# Patient Record
Sex: FEMALE | Race: WHITE | Employment: OTHER | URBAN - METROPOLITAN AREA
[De-identification: names, ages, dates, MRNs, and addresses within clinical notes are randomized per-mention and may not be internally consistent; named-entity substitution may affect disease eponyms.]

---

## 2018-05-08 RX ORDER — SODIUM CHLORIDE 0.9 % (FLUSH) 0.9 %
5-10 SYRINGE (ML) INJECTION EVERY 8 HOURS
Status: CANCELLED | OUTPATIENT
Start: 2018-05-08

## 2018-05-08 RX ORDER — SODIUM CHLORIDE, SODIUM LACTATE, POTASSIUM CHLORIDE, CALCIUM CHLORIDE 600; 310; 30; 20 MG/100ML; MG/100ML; MG/100ML; MG/100ML
150 INJECTION, SOLUTION INTRAVENOUS CONTINUOUS
Status: CANCELLED | OUTPATIENT
Start: 2018-05-14

## 2018-05-08 RX ORDER — SODIUM CHLORIDE 0.9 % (FLUSH) 0.9 %
5-10 SYRINGE (ML) INJECTION AS NEEDED
Status: CANCELLED | OUTPATIENT
Start: 2018-05-08

## 2018-05-09 RX ORDER — MAGNESIUM 200 MG
1000 TABLET ORAL DAILY
COMMUNITY

## 2018-05-09 RX ORDER — GLUCOSAMINE HCL 500 MG
5000 TABLET ORAL DAILY
COMMUNITY

## 2018-05-09 RX ORDER — OMEGA-3-ACID ETHYL ESTERS 1 G/1
2 CAPSULE, LIQUID FILLED ORAL
COMMUNITY

## 2018-05-09 RX ORDER — GLUCOSAMINE/CHONDR SU A SOD 750-600 MG
1 TABLET ORAL DAILY
COMMUNITY

## 2018-05-09 RX ORDER — ASCORBIC ACID 500 MG
3000 TABLET ORAL DAILY
COMMUNITY

## 2018-05-09 RX ORDER — BISMUTH SUBSALICYLATE 262 MG
1 TABLET,CHEWABLE ORAL DAILY
COMMUNITY

## 2018-05-09 RX ORDER — BACLOFEN 20 MG
500 TABLET ORAL DAILY
COMMUNITY

## 2018-05-09 RX ORDER — ESCITALOPRAM OXALATE 10 MG/1
5 TABLET ORAL DAILY
COMMUNITY

## 2018-05-09 RX ORDER — HYDROCHLOROTHIAZIDE 25 MG/1
25 TABLET ORAL DAILY
COMMUNITY
End: 2019-01-01

## 2018-05-10 ENCOUNTER — HOSPITAL ENCOUNTER (OUTPATIENT)
Dept: PREADMISSION TESTING | Age: 78
Discharge: HOME | End: 2018-05-10
Payer: COMMERCIAL

## 2018-05-10 ENCOUNTER — HOSPITAL ENCOUNTER (OUTPATIENT)
Dept: LAB | Age: 78
Discharge: HOME | End: 2018-05-10
Payer: COMMERCIAL

## 2018-05-10 DIAGNOSIS — Z01.818 PRE-OP TESTING: ICD-10-CM

## 2018-05-10 LAB
ABO + RH BLD: NORMAL
BASOPHILS # BLD: 0 K/UL (ref 0–0.06)
BASOPHILS NFR BLD: 1 % (ref 0–2)
BLOOD GROUP ANTIBODIES SERPL: NORMAL
DIFFERENTIAL METHOD BLD: ABNORMAL
EOSINOPHIL # BLD: 0.1 K/UL (ref 0–0.4)
EOSINOPHIL NFR BLD: 3 % (ref 0–5)
ERYTHROCYTE [DISTWIDTH] IN BLOOD BY AUTOMATED COUNT: 12.7 % (ref 11.6–14.5)
HCT VFR BLD AUTO: 37.4 % (ref 35–45)
HGB BLD-MCNC: 13 G/DL (ref 12–16)
LYMPHOCYTES # BLD: 1.2 K/UL (ref 0.9–3.6)
LYMPHOCYTES NFR BLD: 33 % (ref 21–52)
MCH RBC QN AUTO: 32.5 PG (ref 24–34)
MCHC RBC AUTO-ENTMCNC: 34.8 G/DL (ref 31–37)
MCV RBC AUTO: 93.5 FL (ref 74–97)
MONOCYTES # BLD: 0.3 K/UL (ref 0.05–1.2)
MONOCYTES NFR BLD: 8 % (ref 3–10)
NEUTS SEG # BLD: 2 K/UL (ref 1.8–8)
NEUTS SEG NFR BLD: 55 % (ref 40–73)
PLATELET # BLD AUTO: 272 K/UL (ref 135–420)
PMV BLD AUTO: 9.8 FL (ref 9.2–11.8)
RBC # BLD AUTO: 4 M/UL (ref 4.2–5.3)
SPECIMEN EXP DATE BLD: NORMAL
WBC # BLD AUTO: 3.6 K/UL (ref 4.6–13.2)

## 2018-05-10 PROCEDURE — 86900 BLOOD TYPING SEROLOGIC ABO: CPT | Performed by: OBSTETRICS & GYNECOLOGY

## 2018-05-10 PROCEDURE — 93005 ELECTROCARDIOGRAM TRACING: CPT

## 2018-05-10 PROCEDURE — 36415 COLL VENOUS BLD VENIPUNCTURE: CPT | Performed by: OBSTETRICS & GYNECOLOGY

## 2018-05-10 PROCEDURE — 85025 COMPLETE CBC W/AUTO DIFF WBC: CPT | Performed by: OBSTETRICS & GYNECOLOGY

## 2018-05-11 ENCOUNTER — ANESTHESIA EVENT (OUTPATIENT)
Dept: SURGERY | Age: 78
End: 2018-05-11
Payer: COMMERCIAL

## 2018-05-11 LAB
ATRIAL RATE: 57 BPM
CALCULATED P AXIS, ECG09: 56 DEGREES
CALCULATED T AXIS, ECG11: 48 DEGREES
DIAGNOSIS, 93000: NORMAL
P-R INTERVAL, ECG05: 196 MS
Q-T INTERVAL, ECG07: 444 MS
QRS DURATION, ECG06: 104 MS
QTC CALCULATION (BEZET), ECG08: 432 MS
VENTRICULAR RATE, ECG03: 57 BPM

## 2018-05-14 ENCOUNTER — HOSPITAL ENCOUNTER (OUTPATIENT)
Age: 78
Setting detail: OBSERVATION
Discharge: HOME | End: 2018-05-15
Attending: OBSTETRICS & GYNECOLOGY | Admitting: OBSTETRICS & GYNECOLOGY
Payer: COMMERCIAL

## 2018-05-14 ENCOUNTER — ANESTHESIA (OUTPATIENT)
Dept: SURGERY | Age: 78
End: 2018-05-14
Payer: COMMERCIAL

## 2018-05-14 PROBLEM — N95.0 POSTMENOPAUSAL BLEEDING: Status: ACTIVE | Noted: 2018-05-14

## 2018-05-14 PROBLEM — Z48.89 OBSERVATION AFTER SURGERY: Status: ACTIVE | Noted: 2018-05-14

## 2018-05-14 LAB
ALBUMIN SERPL-MCNC: 3.9 G/DL (ref 3.4–5)
ALBUMIN/GLOB SERPL: 1.4 {RATIO} (ref 0.8–1.7)
ALP SERPL-CCNC: 67 U/L (ref 45–117)
ALT SERPL-CCNC: 27 U/L (ref 13–56)
ANION GAP SERPL CALC-SCNC: 8 MMOL/L (ref 3–18)
AST SERPL-CCNC: 21 U/L (ref 15–37)
BASOPHILS # BLD: 0 K/UL (ref 0–0.06)
BASOPHILS NFR BLD: 0 % (ref 0–2)
BILIRUB SERPL-MCNC: 0.6 MG/DL (ref 0.2–1)
BUN SERPL-MCNC: 11 MG/DL (ref 7–18)
BUN/CREAT SERPL: 16 (ref 12–20)
CALCIUM SERPL-MCNC: 8.3 MG/DL (ref 8.5–10.1)
CHLORIDE SERPL-SCNC: 103 MMOL/L (ref 100–108)
CO2 SERPL-SCNC: 28 MMOL/L (ref 21–32)
CREAT SERPL-MCNC: 0.67 MG/DL (ref 0.6–1.3)
DIFFERENTIAL METHOD BLD: ABNORMAL
EOSINOPHIL # BLD: 0 K/UL (ref 0–0.4)
EOSINOPHIL NFR BLD: 0 % (ref 0–5)
ERYTHROCYTE [DISTWIDTH] IN BLOOD BY AUTOMATED COUNT: 13 % (ref 11.6–14.5)
GLOBULIN SER CALC-MCNC: 2.7 G/DL (ref 2–4)
GLUCOSE SERPL-MCNC: 129 MG/DL (ref 74–99)
HCT VFR BLD AUTO: 37.7 % (ref 35–45)
HGB BLD-MCNC: 13 G/DL (ref 12–16)
LYMPHOCYTES # BLD: 0.5 K/UL (ref 0.9–3.6)
LYMPHOCYTES NFR BLD: 10 % (ref 21–52)
MCH RBC QN AUTO: 32.5 PG (ref 24–34)
MCHC RBC AUTO-ENTMCNC: 34.5 G/DL (ref 31–37)
MCV RBC AUTO: 94.3 FL (ref 74–97)
MONOCYTES # BLD: 0.1 K/UL (ref 0.05–1.2)
MONOCYTES NFR BLD: 2 % (ref 3–10)
NEUTS SEG # BLD: 4.2 K/UL (ref 1.8–8)
NEUTS SEG NFR BLD: 88 % (ref 40–73)
PLATELET # BLD AUTO: 242 K/UL (ref 135–420)
PMV BLD AUTO: 9.1 FL (ref 9.2–11.8)
POTASSIUM SERPL-SCNC: 3.9 MMOL/L (ref 3.5–5.5)
PROT SERPL-MCNC: 6.6 G/DL (ref 6.4–8.2)
RBC # BLD AUTO: 4 M/UL (ref 4.2–5.3)
SODIUM SERPL-SCNC: 139 MMOL/L (ref 136–145)
WBC # BLD AUTO: 4.8 K/UL (ref 4.6–13.2)

## 2018-05-14 PROCEDURE — 77030005537 HC CATH URETH BARD -A: Performed by: OBSTETRICS & GYNECOLOGY

## 2018-05-14 PROCEDURE — 77030020255 HC SOL INJ LR 1000ML BG

## 2018-05-14 PROCEDURE — 77030032682: Performed by: OBSTETRICS & GYNECOLOGY

## 2018-05-14 PROCEDURE — 74011000250 HC RX REV CODE- 250: Performed by: OBSTETRICS & GYNECOLOGY

## 2018-05-14 PROCEDURE — 77030032490 HC SLV COMPR SCD KNE COVD -B: Performed by: OBSTETRICS & GYNECOLOGY

## 2018-05-14 PROCEDURE — 36415 COLL VENOUS BLD VENIPUNCTURE: CPT | Performed by: OBSTETRICS & GYNECOLOGY

## 2018-05-14 PROCEDURE — 85025 COMPLETE CBC W/AUTO DIFF WBC: CPT | Performed by: OBSTETRICS & GYNECOLOGY

## 2018-05-14 PROCEDURE — 74011250636 HC RX REV CODE- 250/636: Performed by: NURSE ANESTHETIST, CERTIFIED REGISTERED

## 2018-05-14 PROCEDURE — 80053 COMPREHEN METABOLIC PANEL: CPT | Performed by: OBSTETRICS & GYNECOLOGY

## 2018-05-14 PROCEDURE — 74011000250 HC RX REV CODE- 250

## 2018-05-14 PROCEDURE — 74011000272 HC RX REV CODE- 272: Performed by: OBSTETRICS & GYNECOLOGY

## 2018-05-14 PROCEDURE — 77030033998 HC TBNG HYSTROSCPC OUTFLO S&N -C: Performed by: OBSTETRICS & GYNECOLOGY

## 2018-05-14 PROCEDURE — 77030027138 HC INCENT SPIROMETER -A

## 2018-05-14 PROCEDURE — 77030020268 HC MISC GENERAL SUPPLY: Performed by: OBSTETRICS & GYNECOLOGY

## 2018-05-14 PROCEDURE — 74011000258 HC RX REV CODE- 258: Performed by: OBSTETRICS & GYNECOLOGY

## 2018-05-14 PROCEDURE — 76060000032 HC ANESTHESIA 0.5 TO 1 HR: Performed by: OBSTETRICS & GYNECOLOGY

## 2018-05-14 PROCEDURE — 77030033997 HC TBNG HYSTROSCPC INFLO S&N -C: Performed by: OBSTETRICS & GYNECOLOGY

## 2018-05-14 PROCEDURE — 77030032151 HC HYSTSCP FLD MGMT KT DISP S&N -D: Performed by: OBSTETRICS & GYNECOLOGY

## 2018-05-14 PROCEDURE — 74011250636 HC RX REV CODE- 250/636

## 2018-05-14 PROCEDURE — 76210000016 HC OR PH I REC 1 TO 1.5 HR: Performed by: OBSTETRICS & GYNECOLOGY

## 2018-05-14 PROCEDURE — 99218 HC RM OBSERVATION: CPT

## 2018-05-14 PROCEDURE — 74011250636 HC RX REV CODE- 250/636: Performed by: OBSTETRICS & GYNECOLOGY

## 2018-05-14 PROCEDURE — 77030013079 HC BLNKT BAIR HGGR 3M -A: Performed by: ANESTHESIOLOGY

## 2018-05-14 PROCEDURE — 76010000160 HC OR TIME 0.5 TO 1 HR INTENSV-TIER 1: Performed by: OBSTETRICS & GYNECOLOGY

## 2018-05-14 PROCEDURE — 77030012510 HC MSK AIRWY LMA TELE -B: Performed by: ANESTHESIOLOGY

## 2018-05-14 PROCEDURE — 74011250637 HC RX REV CODE- 250/637: Performed by: NURSE ANESTHETIST, CERTIFIED REGISTERED

## 2018-05-14 PROCEDURE — 77030018836 HC SOL IRR NACL ICUM -A: Performed by: OBSTETRICS & GYNECOLOGY

## 2018-05-14 RX ORDER — KETOROLAC TROMETHAMINE 30 MG/ML
15 INJECTION, SOLUTION INTRAMUSCULAR; INTRAVENOUS
Status: ACTIVE | OUTPATIENT
Start: 2018-05-14 | End: 2018-05-15

## 2018-05-14 RX ORDER — FENTANYL CITRATE 50 UG/ML
INJECTION, SOLUTION INTRAMUSCULAR; INTRAVENOUS AS NEEDED
Status: DISCONTINUED | OUTPATIENT
Start: 2018-05-14 | End: 2018-05-14 | Stop reason: HOSPADM

## 2018-05-14 RX ORDER — INSULIN LISPRO 100 [IU]/ML
INJECTION, SOLUTION INTRAVENOUS; SUBCUTANEOUS ONCE
Status: DISCONTINUED | OUTPATIENT
Start: 2018-05-14 | End: 2018-05-14 | Stop reason: HOSPADM

## 2018-05-14 RX ORDER — ONDANSETRON 2 MG/ML
4 INJECTION INTRAMUSCULAR; INTRAVENOUS ONCE
Status: DISCONTINUED | OUTPATIENT
Start: 2018-05-14 | End: 2018-05-14 | Stop reason: HOSPADM

## 2018-05-14 RX ORDER — SODIUM CHLORIDE 0.9 % (FLUSH) 0.9 %
5-10 SYRINGE (ML) INJECTION AS NEEDED
Status: DISCONTINUED | OUTPATIENT
Start: 2018-05-14 | End: 2018-05-15 | Stop reason: HOSPADM

## 2018-05-14 RX ORDER — FENTANYL CITRATE 50 UG/ML
25 INJECTION, SOLUTION INTRAMUSCULAR; INTRAVENOUS AS NEEDED
Status: DISCONTINUED | OUTPATIENT
Start: 2018-05-14 | End: 2018-05-14 | Stop reason: HOSPADM

## 2018-05-14 RX ORDER — SODIUM CHLORIDE, SODIUM LACTATE, POTASSIUM CHLORIDE, CALCIUM CHLORIDE 600; 310; 30; 20 MG/100ML; MG/100ML; MG/100ML; MG/100ML
50 INJECTION, SOLUTION INTRAVENOUS CONTINUOUS
Status: DISCONTINUED | OUTPATIENT
Start: 2018-05-14 | End: 2018-05-14 | Stop reason: HOSPADM

## 2018-05-14 RX ORDER — ONDANSETRON 2 MG/ML
INJECTION INTRAMUSCULAR; INTRAVENOUS AS NEEDED
Status: DISCONTINUED | OUTPATIENT
Start: 2018-05-14 | End: 2018-05-14 | Stop reason: HOSPADM

## 2018-05-14 RX ORDER — SODIUM CHLORIDE 0.9 % (FLUSH) 0.9 %
5-10 SYRINGE (ML) INJECTION EVERY 8 HOURS
Status: DISCONTINUED | OUTPATIENT
Start: 2018-05-14 | End: 2018-05-14 | Stop reason: HOSPADM

## 2018-05-14 RX ORDER — MORPHINE SULFATE 4 MG/ML
4 INJECTION INTRAVENOUS
Status: DISCONTINUED | OUTPATIENT
Start: 2018-05-14 | End: 2018-05-14 | Stop reason: HOSPADM

## 2018-05-14 RX ORDER — EPHEDRINE SULFATE 50 MG/ML
INJECTION, SOLUTION INTRAVENOUS AS NEEDED
Status: DISCONTINUED | OUTPATIENT
Start: 2018-05-14 | End: 2018-05-14 | Stop reason: HOSPADM

## 2018-05-14 RX ORDER — PROPOFOL 10 MG/ML
INJECTION, EMULSION INTRAVENOUS AS NEEDED
Status: DISCONTINUED | OUTPATIENT
Start: 2018-05-14 | End: 2018-05-14 | Stop reason: HOSPADM

## 2018-05-14 RX ORDER — SODIUM CHLORIDE 0.9 % (FLUSH) 0.9 %
5-10 SYRINGE (ML) INJECTION AS NEEDED
Status: DISCONTINUED | OUTPATIENT
Start: 2018-05-14 | End: 2018-05-14 | Stop reason: HOSPADM

## 2018-05-14 RX ORDER — KETOROLAC TROMETHAMINE 30 MG/ML
INJECTION, SOLUTION INTRAMUSCULAR; INTRAVENOUS AS NEEDED
Status: DISCONTINUED | OUTPATIENT
Start: 2018-05-14 | End: 2018-05-14 | Stop reason: HOSPADM

## 2018-05-14 RX ORDER — FAMOTIDINE 20 MG/1
20 TABLET, FILM COATED ORAL ONCE
Status: COMPLETED | OUTPATIENT
Start: 2018-05-14 | End: 2018-05-14

## 2018-05-14 RX ORDER — LIDOCAINE HYDROCHLORIDE 20 MG/ML
INJECTION, SOLUTION EPIDURAL; INFILTRATION; INTRACAUDAL; PERINEURAL AS NEEDED
Status: DISCONTINUED | OUTPATIENT
Start: 2018-05-14 | End: 2018-05-14 | Stop reason: HOSPADM

## 2018-05-14 RX ORDER — SODIUM CHLORIDE, SODIUM LACTATE, POTASSIUM CHLORIDE, CALCIUM CHLORIDE 600; 310; 30; 20 MG/100ML; MG/100ML; MG/100ML; MG/100ML
150 INJECTION, SOLUTION INTRAVENOUS CONTINUOUS
Status: DISCONTINUED | OUTPATIENT
Start: 2018-05-14 | End: 2018-05-14 | Stop reason: HOSPADM

## 2018-05-14 RX ORDER — ONDANSETRON 2 MG/ML
4 INJECTION INTRAMUSCULAR; INTRAVENOUS
Status: DISCONTINUED | OUTPATIENT
Start: 2018-05-14 | End: 2018-05-15 | Stop reason: HOSPADM

## 2018-05-14 RX ORDER — SODIUM CHLORIDE 0.9 % (FLUSH) 0.9 %
5-10 SYRINGE (ML) INJECTION EVERY 8 HOURS
Status: DISCONTINUED | OUTPATIENT
Start: 2018-05-14 | End: 2018-05-15 | Stop reason: HOSPADM

## 2018-05-14 RX ORDER — DEXAMETHASONE SODIUM PHOSPHATE 4 MG/ML
INJECTION, SOLUTION INTRA-ARTICULAR; INTRALESIONAL; INTRAMUSCULAR; INTRAVENOUS; SOFT TISSUE AS NEEDED
Status: DISCONTINUED | OUTPATIENT
Start: 2018-05-14 | End: 2018-05-14 | Stop reason: HOSPADM

## 2018-05-14 RX ORDER — HYDROCODONE BITARTRATE AND ACETAMINOPHEN 5; 325 MG/1; MG/1
1 TABLET ORAL
Status: DISCONTINUED | OUTPATIENT
Start: 2018-05-14 | End: 2018-05-15 | Stop reason: HOSPADM

## 2018-05-14 RX ADMIN — LIDOCAINE HYDROCHLORIDE 60 MG: 20 INJECTION, SOLUTION EPIDURAL; INFILTRATION; INTRACAUDAL; PERINEURAL at 07:36

## 2018-05-14 RX ADMIN — PROPOFOL 150 MG: 10 INJECTION, EMULSION INTRAVENOUS at 07:36

## 2018-05-14 RX ADMIN — KETOROLAC TROMETHAMINE 30 MG: 30 INJECTION, SOLUTION INTRAMUSCULAR; INTRAVENOUS at 08:04

## 2018-05-14 RX ADMIN — FAMOTIDINE 20 MG: 20 TABLET ORAL at 06:47

## 2018-05-14 RX ADMIN — Medication 10 ML: at 21:10

## 2018-05-14 RX ADMIN — PROPOFOL 50 MG: 10 INJECTION, EMULSION INTRAVENOUS at 07:37

## 2018-05-14 RX ADMIN — FENTANYL CITRATE 50 MCG: 50 INJECTION, SOLUTION INTRAMUSCULAR; INTRAVENOUS at 07:41

## 2018-05-14 RX ADMIN — ONDANSETRON 4 MG: 2 INJECTION INTRAMUSCULAR; INTRAVENOUS at 07:45

## 2018-05-14 RX ADMIN — EPHEDRINE SULFATE 10 MG: 50 INJECTION, SOLUTION INTRAVENOUS at 07:48

## 2018-05-14 RX ADMIN — FENTANYL CITRATE 25 MCG: 50 INJECTION, SOLUTION INTRAMUSCULAR; INTRAVENOUS at 07:45

## 2018-05-14 RX ADMIN — CEFAZOLIN SODIUM 1 G: 1 INJECTION, POWDER, FOR SOLUTION INTRAMUSCULAR; INTRAVENOUS at 17:13

## 2018-05-14 RX ADMIN — FENTANYL CITRATE 25 MCG: 50 INJECTION, SOLUTION INTRAMUSCULAR; INTRAVENOUS at 08:30

## 2018-05-14 RX ADMIN — CEFAZOLIN SODIUM 1 G: 1 INJECTION, POWDER, FOR SOLUTION INTRAMUSCULAR; INTRAVENOUS at 11:54

## 2018-05-14 RX ADMIN — FENTANYL CITRATE 25 MCG: 50 INJECTION, SOLUTION INTRAMUSCULAR; INTRAVENOUS at 08:42

## 2018-05-14 RX ADMIN — FENTANYL CITRATE 25 MCG: 50 INJECTION, SOLUTION INTRAMUSCULAR; INTRAVENOUS at 07:36

## 2018-05-14 RX ADMIN — Medication 10 ML: at 17:15

## 2018-05-14 RX ADMIN — SODIUM CHLORIDE, SODIUM LACTATE, POTASSIUM CHLORIDE, AND CALCIUM CHLORIDE 50 ML/HR: 600; 310; 30; 20 INJECTION, SOLUTION INTRAVENOUS at 06:47

## 2018-05-14 RX ADMIN — DEXAMETHASONE SODIUM PHOSPHATE 4 MG: 4 INJECTION, SOLUTION INTRA-ARTICULAR; INTRALESIONAL; INTRAMUSCULAR; INTRAVENOUS; SOFT TISSUE at 07:43

## 2018-05-14 RX ADMIN — SODIUM CHLORIDE, SODIUM LACTATE, POTASSIUM CHLORIDE, AND CALCIUM CHLORIDE: 600; 310; 30; 20 INJECTION, SOLUTION INTRAVENOUS at 07:50

## 2018-05-14 RX ADMIN — FENTANYL CITRATE 25 MCG: 50 INJECTION, SOLUTION INTRAMUSCULAR; INTRAVENOUS at 08:57

## 2018-05-14 NOTE — H&P
Gynecology History and Physical    Name: Ada Bedolla MRN: [id number] SSN: [id number]    YOB: 1940  Age: 78 y.o. Sex: female       Subjective:      Mrs. Jesisca Rothman is a 77yo F with postmenopausal bleeding. Presented to my office after noticing bright red vaginal bleeding after 25years of menopause. Denied pain or other concerns. Ultrasound with irregular appearing endometrium. Endometrial biopsy attempted in the office but unsuccessful because of cervical stenosis. Decision made to proceed to OR for hysteroscopy and D&C to r/o hyperplasia or malignancy. OB History     No data available        Past Medical History:   Diagnosis Date    Depression     Hypertension      Past Surgical History:   Procedure Laterality Date    HX DILATION AND CURETTAGE      HX ORTHOPAEDIC Left     Elbow repair     Social History     Occupational History    Not on file. Social History Main Topics    Smoking status: Never Smoker    Smokeless tobacco: Never Used    Alcohol use 6.0 oz/week     10 Glasses of wine per week      Comment: Champagne    Drug use: No    Sexual activity: Not on file     History reviewed. No pertinent family history. No Known Allergies  Prior to Admission medications    Medication Sig Start Date End Date Taking? Authorizing Provider   hydroCHLOROthiazide (HYDRODIURIL) 25 mg tablet Take 25 mg by mouth daily. Yes Historical Provider   escitalopram oxalate (LEXAPRO) 10 mg tablet Take 10 mg by mouth daily. Yes Historical Provider   multivitamin (ONE A DAY) tablet Take 1 Tab by mouth daily. Yes Historical Provider   ascorbic acid, vitamin C, (VITAMIN C) 500 mg tablet Take 3,000 mg by mouth daily. Yes Historical Provider   glucosamine/chondr knutson A sod (GLUCOSAMINE-CHONDROITIN) 750-600 mg tab Take 1 Tab by mouth daily. Yes Historical Provider   homeopathic drugs (ENZYME PO) Take 2 Tabs by mouth daily.    Yes Historical Provider   cyanocobalamin (VITAMIN B-12) 1,000 mcg sublingual tablet Take 1,000 mcg by mouth daily. Yes Historical Provider   Cholecalciferol, Vitamin D3, 3,000 unit tab Take 5,000 Units by mouth daily. Yes Historical Provider   ubidecarenone/vitamin E mixed (COQ10  PO) Take 1 Tab by mouth daily. Yes Historical Provider   flaxseed 1,000 mg cap Take 2,000 mcg by mouth daily. Yes Historical Provider   omega-3 acid ethyl esters (LOVAZA) 1 gram capsule Take 2 g by mouth daily (with breakfast). Yes Historical Provider   B.infantis-B.ani-B.long-B.bifi (PROBIOTIC 4X) 10-15 mg TbEC Take 2 Tabs by mouth two (2) times a day. Yes Historical Provider   magnesium oxide 500 mg tab Take 500 mg by mouth daily. Yes Historical Provider        Review of Systems:  A comprehensive review of systems was negative except for that written in the History of Present Illness. Objective:     Vitals:    05/09/18 1457 05/14/18 0638   BP:  161/86   Pulse:  64   Resp:  18   Temp:  97.7 °F (36.5 °C)   SpO2:  99%   Weight: 52.2 kg (115 lb) 53.5 kg (118 lb)   Height: 5' 1\" (1.549 m) 5' (1.524 m)       Physical Exam:  Patient without distress. Heart: Regular rate and rhythm  Lung: clear to auscultation throughout lung fields, no wheezes, no rales, no rhonchi and normal respiratory effort  Back: costovertebral angle tenderness absent  Abdomen: soft, nontender  External Genitalia: normal general appearance  Urinary system: urethral meatus normal  Vagina: normal mucosa without prolapse or lesions  Cervix: normal appearance  Adnexa: normal bimanual exam  Uterus: normal single, nontender  Extr: nontender    Assessment:     Postmenopausal bleeding     Plan:     Procedure(s) (LRB):  DILATATION AND CURETTAGE HYSTEROSCOPY, polypectomy,endometerial biopsy (N/A)  Discussed the risks of surgery including the risks of bleeding, infection, deep vein thrombosis, and surgical injuries to internal organs including but not limited to the bowels, bladder, rectum, and female reproductive organs. The patient understands the risks; any and all questions were answered to the patient's satisfaction.     Signed By:  Deepti Vazquez MD     May 14, 2018

## 2018-05-14 NOTE — OP NOTES
Operative Report:      Pre-op diagnosis:   1. Postmenopausal bleeding  2. Cervical stenosis  Post-op diagnosis:   1. Postmenopausal bleeding  2. Cervical stenosis  3. Uterine perforation  Procedure: Diagnostic Hysteroscopy   Surgeon: Viktoria Ralph MD  Assistant: none  EBL: minimal  IVFluids:500cc  UOP: 700cc  Anesthesia: general anesthesia  Findings: Uterine perforation at fundus with dilation of cervix    Procedure:   Patient was taken to the OR after informed consent had been obtained. Surgery identification was completed with the patient and the OR team. She was then placed in the dorsal lithotomy position. She was prepped and draped  in a sterile fashion. A urethral catheter was used to drain the bladder. A speculum was placed into the vagina. The cervical os was tiny and flush with the apex of the vagina. The anterior lip of the cervix was grasped with a single toothed tenaculum. The smallest dilator would not pass through the cervixal os. The flexible os finders were obtained and the smallest one was inserted into the cervix. There was a moderate amount of serosanguinous fluid noticed after insertion of the os finder and uterine perforation suspected. The cervix was dilated enough to pass the 5mm hysteroscope and fluid deficit immediately jumped to 500 and then 1000cc. Perforation at the fundus confirmed. There was no bleeding at the perforation site. The hysteroscope was removed. There was no bleeding from the cervix after several minutes of observation. The tenaculum was removed and hemostasis confirmed. All sponge, lap, needle and instrument counts were correct x 2. Patient returned to recovery area in stable condition.       Viktoria Ralph MD  May 14, 2018

## 2018-05-14 NOTE — PERIOP NOTES
[facility] care of patient via stretcher. Patient alert, on room air. Placed on monitor x3. C/o pain rated 4/10 at this time. Zen.Corry [facility] at bedside. [id number]  Patient pain uncontrolled with Fentanyl dosing at this time. Will follow up with alternative prn EMAR orders. 0191  TRANSFER - OUT REPORT:    Verbal report given to Alexx Larsen RN (name) on Andrew Beltran  being transferred to 2200 (unit) for routine post - op       Report consisted of patients Situation, Background, Assessment and   Recommendations(SBAR). Information from the following report(s) SBAR, Intake/Output, MAR, Recent Results and Cardiac Rhythm nsr was reviewed with the receiving nurse. Lines:   Peripheral IV 05/14/18 Right Forearm (Active)   Site Assessment Clean, dry, & intact 5/14/2018  8:47 AM   Phlebitis Assessment 0 5/14/2018  8:47 AM   Infiltration Assessment 0 5/14/2018  8:47 AM   Dressing Status Clean, dry, & intact 5/14/2018  8:47 AM   Dressing Type Transparent 5/14/2018  8:47 AM   Hub Color/Line Status Pink; Infusing 5/14/2018  8:47 AM   Alcohol Cap Used Yes 5/14/2018  6:46 AM        Opportunity for questions and clarification was provided.       Patient transported with:   Registered Nurse

## 2018-05-14 NOTE — PERIOP NOTES
pts son, Maikol Alcocer, will be back to  patient. If not back in waiting room, please call when surgery complete.  [phone]

## 2018-05-14 NOTE — PROGRESS NOTES
I called Mrs. Marin's son, Williams Segura, and discussed surgery and uterine perforation. Discussed logistics of surgery and quick identification of complication. Reassured no bleeding noted and fluid deficit minimal. Recommend admitting patient overnight for observation, labs and monitoring because of age and long travel distance. All questions answered.   James Paul MD

## 2018-05-14 NOTE — PROGRESS NOTES
TRANSFER - IN REPORT:    Verbal report received from [address] [place] [address] on Ada Bedolla  being received from PACU for routine post - op      Report consisted of patients Situation, Background, Assessment and   Recommendations(SBAR). Information from the following report(s) SBAR and Procedure Summary was reviewed with the receiving nurse. Opportunity for questions and clarification was provided. 0930 Received pt via bed awake and alert in NAD. Scant vaginal bleeding noted on pad. Oriented to call bell, bed and IS with pt giving return demonstration. Son at [facility]. Assisted pt to BR, voided with scant bleeding noted, urine pale pink. Pt tolerated well. 6395 Dr. Malena Mtz in to see pt.

## 2018-05-14 NOTE — ANESTHESIA PREPROCEDURE EVALUATION
Anesthetic History   No history of anesthetic complications            Review of Systems / Medical History  Patient summary reviewed and pertinent labs reviewed    Pulmonary  Within defined limits                 Neuro/Psych   Within defined limits           Cardiovascular    Hypertension              Exercise tolerance: >4 METS     GI/Hepatic/Renal  Within defined limits              Endo/Other  Within defined limits           Other Findings   Comments: Documentation of current medication  Current medications obtained, documented and obtained? YES      Risk Factors for Postoperative nausea/vomiting:       History of postoperative nausea/vomiting? YES       Female? YES       Motion sickness? NO       Intended opioid administration for postoperative analgesia? YES      Smoking Abstinence:  Current Smoker? NO  Elective Surgery? YES  Seen preoperatively by anesthesiologist or proxy prior to day of surgery? YES  Pt abstained from smoking 24 hours prior to anesthesia?  N/A    Preventive care/screening for High Blood Pressure:  Aged 18 years and older: YES  Screened for high blood pressure: YES  Patients with high blood pressure referred to primary care provider   for BP management: YES         Physical Exam    Airway  Mallampati: II  TM Distance: 4 - 6 cm  Neck ROM: normal range of motion   Mouth opening: Normal     Cardiovascular  Regular rate and rhythm,  S1 and S2 normal,  no murmur, click, rub, or gallop  Rhythm: regular  Rate: normal         Dental    Dentition: Lower dentition intact and Upper dentition intact     Pulmonary  Breath sounds clear to auscultation               Abdominal  GI exam deferred       Other Findings            Anesthetic Plan    ASA: 2  Anesthesia type: general          Induction: Intravenous  Anesthetic plan and risks discussed with: Patient

## 2018-05-14 NOTE — PROGRESS NOTES
a  Post op surgery note    Patient: Elliott Lai MRN: [id number]  SSN: [id number]    YOB: 1940  Age: 78 y.o. Sex: female        Spoke to Mrs Jayashree Rolle and her son after surgery. Denied pain or other concerns. Patient Vitals for the past 8 hrs:   Temp Pulse Resp BP SpO2   05/14/18 1200 97.9 °F (36.6 °C) 75 22 139/75 96 %   05/14/18 0847 - 70 22 165/76 94 %   05/14/18 0843 - 68 14 159/73 96 %   05/14/18 0832 - 66 15 165/72 97 %   05/14/18 0820 97.3 °F (36.3 °C) 71 9 149/81 99 %   05/14/18 0817 97.3 °F (36.3 °C) 71 10 149/81 99 %   05/14/18 0638 97.7 °F (36.5 °C) 64 18 161/86 99 %            WBC   Date/Time Value Ref Range Status   05/14/2018 12:40 PM 4.8 4.6 - 13.2 K/uL Final   05/10/2018 03:16 PM 3.6 (L) 4.6 - 13.2 K/uL Final     HGB   Date/Time Value Ref Range Status   05/14/2018 12:40 PM 13.0 12.0 - 16.0 g/dL Final   05/10/2018 03:16 PM 13.0 12.0 - 16.0 g/dL Final     PLATELET   Date/Time Value Ref Range Status   05/14/2018 12:40  135 - 420 K/uL Final         Date 05/13/18 0700 - 05/14/18 0659(Not Admitted) 05/14/18 0700 - 05/15/18 0659   Shift 6166-0027 1537-3035 24 Hour Total 8721-9522 4912-1227 24 Hour Total   I  N  T  A  K  E   I.V.  (mL/kg/hr)    1300  1300      I.V.    200  200      Volume (lactated Ringers infusion)    1100  1100    Other    1290  1290      Other    1290  1290    Shift Total  (mL/kg)    2590  (48.4)  2590  (48.4)   O  U  T  P  U  T   Urine  (mL/kg/hr)    1690  1690      Urine    720  720      Urine Voided    250  250      Urine Output    720  720    Other    1140  1140      Other Output    1140  1140    Shift Total  (mL/kg)    2830  (52.9)  2830  (52.9)   NET    -240  -240   Weight (kg) 52.2 53.5 53.5 53.5 53.5 53.5       EXAM:  Abdomen soft not tender.     IMPRESSION:  POD 0 after hysteroscopy c/b uterine perforation so procedure aborted    PLAN:  H&H stable  CMP pending  Ancef for 24hrs  If all stable will dc in am      Viktoria Ralph MD

## 2018-05-14 NOTE — IP AVS SNAPSHOT
[address] [facility], [place] 
 
 
 [address] Patient: Maribell Cabrera MRN: [id number] [facility]:[id number]/43/8949 About your hospitalization You were admitted on:  May 14, 2018 You last received care in the:  [address] [place] [address],[address] You were discharged on:  May 15, 2018 Why you were hospitalized Your primary diagnosis was:  Postmenopausal Bleeding Your diagnoses also included:  Observation After Surgery Follow-up Information Follow up With Details Comments Contact Info Estevan Curry MD In 2 weeks for follow up [address] [place] [id number] 
[phone] Maty Mar MD   [address] [place] [address] 
[phone] Discharge Orders None A check jacqueline indicates which time of day the medication should be taken. My Medications START taking these medications Instructions Each Dose to Equal  
 Morning Noon Evening Bedtime  
 doxycycline 100 mg capsule Commonly known as:  Angelita Cano Your last dose was: Your next dose is: Take 1 Cap by mouth two (2) times a day for 7 days. 100 mg CONTINUE taking these medications Instructions Each Dose to Equal  
 Morning Noon Evening Bedtime  
 ascorbic acid (vitamin C) 500 mg tablet Commonly known as:  VITAMIN C Your last dose was: Your next dose is: Take 3,000 mg by mouth daily. 3000 mg Cholecalciferol (Vitamin D3) 3,000 unit Tab Your last dose was: Your next dose is: Take 5,000 Units by mouth daily. 5000 Units COQ10  PO Your last dose was: Your next dose is: Take 1 Tab by mouth daily. 1 Tab ENZYME PO Your last dose was: Your next dose is: Take 2 Tabs by mouth daily. 2 Tab  
    
   
   
   
  
 escitalopram oxalate 10 mg tablet Commonly known as:  Fulton Roof Your last dose was: Your next dose is: Take 10 mg by mouth daily. 10 mg  
    
   
   
   
  
 flaxseed 1,000 mg Cap Your last dose was: Your next dose is: Take 2,000 mcg by mouth daily. 2000 mcg  
    
   
   
   
  
 glucosamine-chondroitin 750-600 mg Tab Your last dose was: Your next dose is: Take 1 Tab by mouth daily. 1 Tab  
    
   
   
   
  
 hydroCHLOROthiazide 25 mg tablet Commonly known as:  HYDRODIURIL Your last dose was: Your next dose is: Take 25 mg by mouth daily. 25 mg  
    
   
   
   
  
 magnesium oxide 500 mg Tab Your last dose was: Your next dose is: Take 500 mg by mouth daily. 500 mg  
    
   
   
   
  
 multivitamin tablet Commonly known as:  ONE A DAY Your last dose was: Your next dose is: Take 1 Tab by mouth daily. 1 Tab  
    
   
   
   
  
 omega-3 acid ethyl esters 1 gram capsule Commonly known as:  Epimenio Neha Your last dose was: Your next dose is: Take 2 g by mouth daily (with breakfast). 2 g PROBIOTIC 4X 10-15 mg Tbec Generic drug:  B.infantis-B.ani-B.long-B.bifi Your last dose was: Your next dose is: Take 2 Tabs by mouth two (2) times a day. 2 Tab  
    
   
   
   
  
 VITAMIN B-12 1,000 mcg sublingual tablet Generic drug:  cyanocobalamin Your last dose was: Your next dose is: Take 1,000 mcg by mouth daily. 1000 mcg Where to Get Your Medications Information on where to get these meds will be given to you by the nurse or doctor. ! Ask your nurse or doctor about these medications  
  doxycycline 100 mg capsule Discharge Instructions DISCHARGE SUMMARY from Nurse PATIENT INSTRUCTIONS: 
 
 
F-face looks uneven A-arms unable to move or move unevenly S-speech slurred or non-existent T-time-call 911 as soon as signs and symptoms begin-DO NOT go Back to bed or wait to see if you get better-TIME IS BRAIN. Warning Signs of HEART ATTACK Call 911 if you have these symptoms: 
? Chest discomfort. Most heart attacks involve discomfort in the center of the chest that lasts more than a few minutes, or that goes away and comes back. It can feel like uncomfortable pressure, squeezing, fullness, or pain. ? Discomfort in other areas of the upper body. Symptoms can include pain or discomfort in one or both arms, the back, neck, jaw, or stomach. ? Shortness of breath with or without chest discomfort. ? Other signs may include breaking out in a cold sweat, nausea, or lightheadedness. Don't wait more than five minutes to call [address]! Fast action can save your life. Calling 911 is almost always the fastest way to get lifesaving treatment. Emergency Medical Services staff can begin treatment when they arrive  up to an hour sooner than if someone gets to the hospital by car. The discharge information has been reviewed with the patient. The patient verbalized understanding. Discharge medications reviewed with the patient and appropriate educational materials and side effects teaching were provided. Patient armband removed and shredded. ___________________________________________________________________________________________________________________________________ MyChart Announcement We are excited to announce that we are making your provider's discharge notes available to you in Pace4Life. You will see these notes when they are completed and signed by the physician that discharged you from your recent hospital stay. If you have any questions or concerns about any information you see in Pace4Life, please call the Health Information Department where you were seen or reach out to your Primary Care Provider for more information about your plan of care. Introducing [facility]! [facility] introduces Pace4Life patient portal. Now you can access parts of your medical record, email your doctor's office, and request medication refills online. 1. In your internet browser, go to [email or web address] 2. Click on the First Time User? Click Here link in the Sign In box. You will see the New Member Sign Up page. 3. Enter your Pace4Life Access Code exactly as it appears below. You will not need to use this code after youve completed the sign-up process. If you do not sign up before the expiration date, you must request a new code. · Pace4Life Access Code: [email or web address] Expires: 8/2/2018 12:15 PM 
 
4. Enter the last four digits of your Social Security Number (xxxx) and Date of Birth (mm/dd/yyyy) as indicated and click Submit. You will be taken to the next sign-up page. 5. Create a Pace4Life ID. This will be your Pace4Life login ID and cannot be changed, so think of one that is secure and easy to remember. 6. Create a Pace4Life password. You can change your password at any time. 7. Enter your Password Reset Question and Answer. This can be used at a later time if you forget your password. 8. Enter your e-mail address. You will receive e-mail notification when new information is available in [address] 9. Click Sign Up. You can now view and download portions of your medical record.  
10. Click the Download Summary menu link to download a portable copy of your medical information. If you have questions, please visit the Frequently Asked Questions section of the [employer]inehart website. Remember, [employer] is NOT to be used for urgent needs. For medical emergencies, dial 911. Now available from your iPhone and Android! Introducing Nick Ramsay As a [facility] patient, I wanted to make you aware of our electronic visit tool called Nick Ramsay. PfeifferPath101 allows you to connect within minutes with a medical provider 24 hours a day, seven days a week via a mobile device or tablet or logging into a secure website from your computer. You can access Nick Ramsay from anywhere in the [place]. A virtual visit might be right for you when you have a simple condition and feel like you just dont want to get out of bed, or cant get away from work for an appointment, when your regular [facility] provider is not available (evenings, weekends or holidays), or when youre out of town and need minor care. Electronic visits cost only $49 and if the [employer] provider determines a prescription is needed to treat your condition, one can be electronically transmitted to a nearby pharmacy*. Please take a moment to enroll today if you have not already done so. The enrollment process is free and takes just a few minutes. To enroll, please download the ImmuneXcite florinda to your tablet or phone, or visit [email or web address] to enroll on your computer. And, as an [address] [place] [address] patient with a [employer] account, the results of your visits will be scanned into your electronic medical record and your primary care provider will be able to view the scanned results. We urge you to continue to see your regular [facility] provider for your ongoing medical care.   And while your primary care provider may not be the one available when you seek a Nick Ramsay virtual visit, the peace of mind you get from getting a real diagnosis real time can be priceless. For more information on Nick Ramsay, view our Frequently Asked Questions (FAQs) at [email or web address]. Sincerely, 
 
Ry Dhaliwal MD 
Chief Medical Officer [address] *:  certain medications cannot be prescribed via Nick Ramsay Providers Seen During Your Hospitalization Provider Specialty Primary office phone Riana Dave MD Obstetrics & Gynecology [phone] Your Primary Care Physician (PCP) Primary Care Physician Office Phone Office Fax [address], [address] [place] [address] [phone] You are allergic to the following No active allergies Recent Documentation Height Weight BMI OB Status Smoking Status 1.524 m 53.5 kg 23.05 kg/m2 Postmenopausal Never Smoker Emergency Contacts Name Discharge Info Relation Home Work Mobile Milan Marin DISCHARGE CAREGIVER [3] Son [22] [phone] Patient Belongings The following personal items are in your possession at time of discharge: 
  Dental Appliances: None  Visual Aid: Glasses, With patient      Home Medications: None   Jewelry:  (ankle bracelet)  Clothing: Pants, Shirt, Footwear, Socks, Undergarments    Other Valuables: Haleigh Kyle, Cell Phone Discharge Instructions Attachments/References HYSTEROSCOPY: POST-OP (ENGLISH) DOXYCYCLINE (BY MOUTH) (ENGLISH) Patient Handouts Hysteroscopy: What to Expect at [facility] Your Recovery A hysteroscopy is a procedure to find and treat problems with your uterus. It may have been done to remove growths from the uterus. Or it may have been done to diagnose or treat fertility problems. It can also be used to diagnose or treat abnormal bleeding. If the doctor filled your uterus with air, you may have gas pains or your belly may feel full.  You may have cramps and light vaginal bleeding for a few days to several weeks. The cramps and bleeding may last longer if the hysteroscopy was used for treatment. You may also have shoulder pain right after the procedure. If the doctor filled your uterus with liquid, you may have watery vaginal discharge for several weeks. This care sheet gives you a general idea about how long it will take for you to recover. But each person recovers at a different pace. Follow the steps below to get better as quickly as possible. How can you care for yourself at home? Activity ? · Rest when you feel tired. ? · You can do your normal activities when it feels okay to do so.  
? · You may shower and take baths as usual.  
? · Ask your doctor when it is okay for you to have sex. Diet ? · You can eat your normal diet. If your stomach is upset, try bland, low-fat foods like plain rice, broiled chicken, toast, and yogurt. ? · If your bowel movements are not regular right after surgery, try to avoid constipation and straining. Drink plenty of water. Your doctor may suggest fiber, a stool softener, or a mild laxative. Medicines ? · Your doctor will tell you if and when you can restart your medicines. He or she will also give you instructions about taking any new medicines. ? · If you take aspirin or some other blood thinner, be sure to talk to your doctor. He or she will tell you if and when to start taking this medicine again. Make sure that you understand exactly what your doctor wants you to do. ? · Be safe with medicines. Read and follow all instructions on the label. ¨ If the doctor gave you a prescription medicine for pain, take it as prescribed. ¨ If you are not taking a prescription pain medicine, ask your doctor if you can take an over-the-counter medicine. ? · If your doctor prescribed antibiotics, take them as directed. Do not stop taking them just because you feel better. You need to take the full course of antibiotics. Other instructions ? · You may have some light vaginal bleeding. Wear sanitary pads if needed. Do not use tampons until your doctor says it is okay. ? · You may want to use a heating pad on your belly to help with pain. Use a low heat setting. ? · Talk about birth control with your doctor. Do not try to become pregnant until your doctor says it is okay. Follow-up care is a key part of your treatment and safety. Be sure to make and go to all appointments, and call your doctor if you are having problems. It's also a good idea to know your test results and keep a list of the medicines you take. When should you call for help? Call 911 anytime you think you may need emergency care. For example, call if: 
? · You passed out (lost consciousness). ? · You have chest pain, are short of breath, or cough up blood. ?Call your doctor now or seek immediate medical care if: 
? · You have pain that does not get better after you take pain medicine. ? · You cannot pass stools or gas. ? · You have vaginal discharge that has increased in amount or smells bad.  
? · You are sick to your stomach or cannot drink fluids. ? · You have signs of infection, such as: 
¨ Increased pain, swelling, warmth, or redness. ¨ A fever. ? · You have bright red vaginal bleeding that soaks one or more pads in an hour, or you have large clots. ? · You have signs of a blood clot in your leg (called a deep vein thrombosis), such as: 
¨ Pain in your calf, back of the knee, thigh, or groin. ¨ Redness and swelling in your leg. ? Watch closely for changes in your health, and be sure to contact your doctor if you have any problems. Where can you learn more? Go to http://pro-lionel.info/. Enter I360 in the search box to learn more about \"Hysteroscopy: What to Expect at Home. \" Current as of: October 13, 2016 Content Version: 11.4 © 4611-1848 Healthwise, Incorporated.  Care instructions adapted under license by [facility] [address] (which disclaims liability or warranty for this information). If you have questions about a medical condition or this instruction, always ask your healthcare professional. [address] any warranty or liability for your use of this information. Doxycycline (By mouth) Doxycycline ([id number]-kleen) Treats and prevents infections. Also used to prevent malaria and treat rosacea or severe acne. This medicine is a tetracycline antibiotic. Brand Name(s): Acticlate, Adoxa, Adoxa Rolo 1/150, Avidoxy, Avidoxy DK, BenzoDox 30 Kit, BenzoDox 60 Kit, Doryx, Doryx MPC, Monodox, Morgidox [id number], Morgidox [id number] Kit, Morgidox 1x50MG, Morgidox 1x50MG Kit, Morgidox [id number] There may be other brand names for this medicine. When This Medicine Should Not Be Used: This medicine is not right for everyone. Do not use it if you had an allergic reaction to doxycycline or another tetracycline antibiotic, or if you are pregnant or breastfeeding. How to Use This Medicine:  
Capsule, Delayed Release Capsule, Long Acting Capsule, Liquid, Tablet, Delayed Release Tablet · Your doctor will tell you how much medicine to use. Do not use more than directed. · Ask your pharmacist or doctor if you need to take this medicine with or without food. Some forms can be taken with food or milk, but others must be taken on an empty stomach. · Oracea® capsules: This medicine must be taken on an empty stomach, at least 1 hour before or 2 hours after a meal. 
· Capsule: Swallow whole. Do not break, crush, chew, or open it. · Delayed-release tablets: You may also take this medicine by sprinkling the broken tablets onto room-temperature applesauce. Swallow this mixture right away. Do not chew it. Do not store the mixture for later use. You may take this medicine with food or milk to avoid stomach upset. · Oral liquid: Shake the bottle well just before each use.  Measure the oral liquid medicine with a marked measuring spoon, oral syringe, or medicine cup. · Tablets: You may take this medicine with food or milk to avoid stomach irritation. To break a tablet, hold the tablet between your thumb and index fingers close to the appropriate scored line. Then, apply enough pressure to snap the tablet segments apart. Do not use the tablet if it does not break on the scored lines. · Take all of the medicine in your prescription to clear up your infection, even if you feel better after the first few doses. · Drink plenty of fluids to avoid throat problems, if you take the capsule or tablet form. · Malaria prevention: Start taking the medicine 1 or 2 days before you travel. Take the medicine every day during your trip. Keep taking it for 4 weeks after you return. However, do not use the medicine for longer than 4 months. · Do not use this medicine for more than 9 months if you are using it for rosacea. · Use only the brand of medicine your doctor prescribed. Other brands may not work the same way. · Read and follow the patient instructions that come with this medicine. Talk to your doctor or pharmacist if you have any questions. · Missed dose: Take a dose as soon as you remember. If it is almost time for your next dose, wait until then and take a regular dose. Do not take extra medicine to make up for a missed dose. · Store the medicine in a closed container at room temperature, away from heat, moisture, and direct light. Do not freeze the oral liquid. Drugs and Foods to Avoid: Ask your doctor or pharmacist before using any other medicine, including over-the-counter medicines, vitamins, and herbal products. · Some medicines can affect how doxycycline works. Tell your doctor if you are using any of the following: ¨ Bismuth subsalicylate ¨ Acne medicines (including isotretinoin) ¨ Birth control pills ¨ Blood thinner (including warfarin) ¨ Medicine for seizures (including carbamazepine, phenobarbital, phenytoin) ¨ Medicine that contains aluminum, calcium, or iron (including an antacid or vitamin supplement) ¨ Medicine to treat psoriasis (including acitretin) ¨ Penicillin antibiotic ¨ Stomach medicine Warnings While Using This Medicine: · This medicine may cause birth defects if either partner is using it during conception or pregnancy. Tell your doctor right away if you or your partner becomes pregnant. Birth control pills may not work as well when used with this medicine. Use a second form of birth control to keep from getting pregnant. · Tell your doctor if you have kidney disease, liver disease, asthma, or an allergy to sulfites. Tell your doctor if you had surgery on your stomach, or if you have a history of yeast infections. · This medicine may cause the following problems: ¨ Permanent change in tooth color (in children younger than 6years old) ¨ Increased pressure inside the head ¨ Yeast infection ¨ Immune system problems · This medicine can cause diarrhea. Call your doctor if the diarrhea becomes severe, does not stop, or is bloody. Do not take any medicine to stop diarrhea until you have talked to your doctor. Diarrhea can occur 2 months or more after you stop taking this medicine. · This medicine may make your skin more sensitive to sunlight. Wear sunscreen. Do not use sunlamps or tanning beds. · Tell any doctor or dentist who treats you that you are using this medicine. This medicine may affect certain medical test results. · Call your doctor if your symptoms do not improve or if they get worse. · Your doctor will do lab tests at regular visits to check on the effects of this medicine. Keep all appointments. · Keep all medicine out of the reach of children. Never share your medicine with anyone. Possible Side Effects While Using This Medicine:  
Call your doctor right away if you notice any of these side effects: · Allergic reaction: Itching or hives, swelling in your face or hands, swelling or tingling in your mouth or throat, chest tightness, trouble breathing · Blistering, peeling, red skin rash · Burning, pain, or irritation in your upper stomach or throat · Diarrhea that may contain blood · Fever, chills, cough, runny or stuffy nose, sore throat, body aches · Joint pain, fever, rash, and unusual tiredness or weakness · Severe headache, dizziness, vision changes · Sudden and severe stomach pain, nausea, vomiting, lightheadedness If you notice these less serious side effects, talk with your doctor: · Darkening of your skin, scars, teeth, or gums · Sores or white patches on your lips, mouth, or throat If you notice other side effects that you think are caused by this medicine, tell your doctor. Call your doctor for medical advice about side effects. You may report side effects to FDA at 9-510-FDA-1686 © 2017 [address] Information is for End User's use only and may not be sold, redistributed or otherwise used for commercial purposes. The above information is an  only. It is not intended as medical advice for individual conditions or treatments. Talk to your doctor, nurse or pharmacist before following any medical regimen to see if it is safe and effective for you. Please provide this summary of care documentation to your next provider. Signatures-by signing, you are acknowledging that this After Visit Summary has been reviewed with you and you have received a copy. Patient Signature:  ____________________________________________________________ Date:  ____________________________________________________________  
  
Nasreen Hoffman Provider Signature:  ____________________________________________________________ Date:  ____________________________________________________________

## 2018-05-14 NOTE — ANESTHESIA POSTPROCEDURE EVALUATION
Post-Anesthesia Evaluation and Assessment    Patient: Bonny Carr MRN: [id number]  SSN: [id number]    YOB: 1940  Age: 78 y.o. Sex: female       Cardiovascular Function/Vital Signs  Visit Vitals    /75 (BP 1 Location: Right arm, BP Patient Position: At rest)    Pulse 75    Temp 36.6 °C (97.9 °F)    Resp 22    Ht 5' (1.524 m)    Wt 53.5 kg (118 lb)    SpO2 96%    BMI 23.05 kg/m2       Patient is status post general anesthesia for Procedure(s):  Diagnostic HYSTEROSCOPY, . Nausea/Vomiting: None    Postoperative hydration reviewed and adequate. Pain:  Pain Scale 1: Numeric (0 - 10) (05/14/18 1055)  Pain Intensity 1: 1 (05/14/18 1055)   Managed    Neurological Status:   Neuro (WDL): Within Defined Limits (05/14/18 0847)   At baseline    Mental Status and Level of Consciousness: Arousable    Pulmonary Status:   O2 Device: Room air (05/14/18 1055)   Adequate oxygenation and airway patent    Complications related to anesthesia: None    Post-anesthesia assessment completed.  No concerns    Signed By: Dulce Osman MD     May 14, 2018

## 2018-05-14 NOTE — PROGRESS NOTES
conducted a pre-surgery visit with Maribell Cabrera, who is a 78 y.o.,female. The  provided the following Interventions:  Initiated a relationship of care and support. Offered prayer and assurance of continued prayers on patient's behalf. Plan:  Chaplains will continue to follow and will provide pastoral care on an as needed/requested basis.  recommends bedside caregivers page  on duty if patient shows signs of acute spiritual or emotional distress.     Ly Castillo   Spiritual Care   [phone]

## 2018-05-15 VITALS
BODY MASS INDEX: 23.16 KG/M2 | RESPIRATION RATE: 20 BRPM | HEART RATE: 88 BPM | WEIGHT: 118 LBS | DIASTOLIC BLOOD PRESSURE: 77 MMHG | SYSTOLIC BLOOD PRESSURE: 117 MMHG | TEMPERATURE: 98.3 F | OXYGEN SATURATION: 97 % | HEIGHT: 60 IN

## 2018-05-15 LAB
ERYTHROCYTE [DISTWIDTH] IN BLOOD BY AUTOMATED COUNT: 13.2 % (ref 11.6–14.5)
HCT VFR BLD AUTO: 34 % (ref 35–45)
HGB BLD-MCNC: 11.5 G/DL (ref 12–16)
MCH RBC QN AUTO: 32 PG (ref 24–34)
MCHC RBC AUTO-ENTMCNC: 33.8 G/DL (ref 31–37)
MCV RBC AUTO: 94.7 FL (ref 74–97)
PLATELET # BLD AUTO: 238 K/UL (ref 135–420)
PMV BLD AUTO: 9.7 FL (ref 9.2–11.8)
RBC # BLD AUTO: 3.59 M/UL (ref 4.2–5.3)
WBC # BLD AUTO: 4.9 K/UL (ref 4.6–13.2)

## 2018-05-15 PROCEDURE — 99218 HC RM OBSERVATION: CPT

## 2018-05-15 PROCEDURE — 85027 COMPLETE CBC AUTOMATED: CPT | Performed by: OBSTETRICS & GYNECOLOGY

## 2018-05-15 PROCEDURE — 74011250636 HC RX REV CODE- 250/636: Performed by: OBSTETRICS & GYNECOLOGY

## 2018-05-15 PROCEDURE — 36415 COLL VENOUS BLD VENIPUNCTURE: CPT | Performed by: OBSTETRICS & GYNECOLOGY

## 2018-05-15 PROCEDURE — 74011000258 HC RX REV CODE- 258: Performed by: OBSTETRICS & GYNECOLOGY

## 2018-05-15 RX ORDER — DOXYCYCLINE 100 MG/1
100 CAPSULE ORAL 2 TIMES DAILY
Qty: 14 CAP | Refills: 0 | Status: SHIPPED | OUTPATIENT
Start: 2018-05-15 | End: 2018-05-22

## 2018-05-15 RX ADMIN — Medication 10 ML: at 06:05

## 2018-05-15 RX ADMIN — CEFAZOLIN SODIUM 1 G: 1 INJECTION, POWDER, FOR SOLUTION INTRAMUSCULAR; INTRAVENOUS at 01:36

## 2018-05-15 NOTE — ROUTINE PROCESS
Assumed care of patient at 2014 report received from 2505 VADIM Browning. All safety precautions in place - safety maintained. 2015 - Ambulated pt to BR, voiding & back to bed. Pt denies pain & nausea. Call bell within reach. 2300 - Pt denies pain. Offer med ,pt refused. 0200 - Pt awake and in NAD.  0430 - Pt resting.    0745 - Bedside and Verbal shift change report given to Jassi Morales RN (oncoming nurse) by Marguerite Choudhary RN BSN (offgoing nurse). Report given with SBAR, Kardex, Intake/Output, MAR and Recent Results.

## 2018-05-15 NOTE — PROGRESS NOTES
0800  Received pt on bed, Dr Mariann Persaud in  theroom for discharge today, monitor  For bleeding. 0930  Up  Walking in the room, per pt vaginal spotting  Slow down, discharge instriction done by  VADIM Waters, pt walking in the room waiting for her ride, offered pain med but does not want at this time.

## 2018-05-15 NOTE — PROGRESS NOTES
Chart reviewewd and patient verified demographics. Last PCP appt was 2 weeks ago. She is independnet with ADL. Son or grandson to drive her home at ID. Reason for Admission:    She had home health in 2009 for broken  Back. stmenopausal bleeding                   RRAT Score:    8                 Plan for utilizing home health:      None. She hires someone to do her grocery shopping and to cut up all her foods. History of shoulder injury. Likelihood of Readmission:  low                         Transition of Care Plan:  She plans to go home.

## 2018-05-15 NOTE — PROGRESS NOTES
Patient and/or next of kin has been given and has signed the [facility] Outpatient Observation  Notification letter and all questions answered. Copy of this notice given to patient and copy placed on chart. Patient and/or next of kin has been given the Outpatient Observation Information and Notification letter and all questions answered.

## 2018-05-15 NOTE — PROGRESS NOTES
Care Management Interventions  PCP Verified by CM: Yes  Palliative Care Criteria Met (RRAT>21 & CHF Dx)?: No  Mode of Transport at Discharge:  Other (see comment) (son or grandson)  Transition of Care Consult (CM Consult): Discharge Planning  MyChart Signup: No  Discharge Durable Medical Equipment: No  Physical Therapy Consult: No  Occupational Therapy Consult: No  Speech Therapy Consult: No  Current Support Network: Lives Alone  Confirm Follow Up Transport: Family  Plan discussed with Pt/Family/Caregiver: Yes  Discharge Location  Discharge Placement: Home

## 2018-05-15 NOTE — DISCHARGE SUMMARY
Gynecology Surgical Discharge Summary     Name: Aquilino Hidalgo MRN: [id number]  SSN: [id number]    YOB: 1940  Age: 78 y.o. Sex: female      Admit date: 5/14/2018    Discharge Date: 5/15/2018      Attending Physician: Giovanni Dean MD     Admission Diagnoses: Postmenopausal bleeding    Discharge Diagnoses: Principal Problem:    Postmenopausal bleeding (5/14/2018)    Active Problems:    Observation after surgery (5/14/2018)         Procedures: Diagnostic hysteroscopy    Hospital Course: Hysteroscopy performed for postmenopausal bleeding. Procedure complicated by uterine perforation with dilation of the cervix. Procedure aborted. Patinet observed overnight. Labs and vitals stable. No pain, nausea, vomiting or fever. Discharged on POD 1 with planned follow up in 2 weeks. Significant Diagnostic Studies: Recent Results (from the past 24 hour(s))   CBC WITH AUTOMATED DIFF    Collection Time: 05/14/18 12:40 PM   Result Value Ref Range    WBC 4.8 4.6 - 13.2 K/uL    RBC 4.00 (L) 4.20 - 5.30 M/uL    HGB 13.0 12.0 - 16.0 g/dL    HCT 37.7 35.0 - 45.0 %    MCV 94.3 74.0 - 97.0 FL    MCH 32.5 24.0 - 34.0 PG    MCHC 34.5 31.0 - 37.0 g/dL    RDW 13.0 11.6 - 14.5 %    PLATELET 712 421 - 011 K/uL    MPV 9.1 (L) 9.2 - 11.8 FL    NEUTROPHILS 88 (H) 40 - 73 %    LYMPHOCYTES 10 (L) 21 - 52 %    MONOCYTES 2 (L) 3 - 10 %    EOSINOPHILS 0 0 - 5 %    BASOPHILS 0 0 - 2 %    ABS. NEUTROPHILS 4.2 1.8 - 8.0 K/UL    ABS. LYMPHOCYTES 0.5 (L) 0.9 - 3.6 K/UL    ABS. MONOCYTES 0.1 0.05 - 1.2 K/UL    ABS. EOSINOPHILS 0.0 0.0 - 0.4 K/UL    ABS.  BASOPHILS 0.0 0.0 - 0.06 K/UL    DF AUTOMATED     METABOLIC PANEL, COMPREHENSIVE    Collection Time: 05/14/18 12:40 PM   Result Value Ref Range    Sodium 139 136 - 145 mmol/L    Potassium 3.9 3.5 - 5.5 mmol/L    Chloride 103 100 - 108 mmol/L    CO2 28 21 - 32 mmol/L    Anion gap 8 3.0 - 18 mmol/L    Glucose 129 (H) 74 - 99 mg/dL    BUN 11 7.0 - 18 MG/DL    Creatinine 0.67 0.6 - 1.3 MG/DL    BUN/Creatinine ratio 16 12 - 20      GFR est AA >60 >60 ml/min/1.73m2    GFR est non-AA >60 >60 ml/min/1.73m2    Calcium 8.3 (L) 8.5 - 10.1 MG/DL    Bilirubin, total 0.6 0.2 - 1.0 MG/DL    ALT (SGPT) 27 13 - 56 U/L    AST (SGOT) 21 15 - 37 U/L    Alk. phosphatase 67 45 - 117 U/L    Protein, total 6.6 6.4 - 8.2 g/dL    Albumin 3.9 3.4 - 5.0 g/dL    Globulin 2.7 2.0 - 4.0 g/dL    A-G Ratio 1.4 0.8 - 1.7     CBC W/O DIFF    Collection Time: 05/15/18  4:20 AM   Result Value Ref Range    WBC 4.9 4.6 - 13.2 K/uL    RBC 3.59 (L) 4.20 - 5.30 M/uL    HGB 11.5 (L) 12.0 - 16.0 g/dL    HCT 34.0 (L) 35.0 - 45.0 %    MCV 94.7 74.0 - 97.0 FL    MCH 32.0 24.0 - 34.0 PG    MCHC 33.8 31.0 - 37.0 g/dL    RDW 13.2 11.6 - 14.5 %    PLATELET 099 215 - 430 K/uL    MPV 9.7 9.2 - 11.8 FL       Vitals:    Patient Vitals for the past 8 hrs:   BP Temp Pulse Resp SpO2   05/15/18 0005 121/74 98.1 °F (36.7 °C) 65 20 98 %     Temp (24hrs), Av.7 °F (36.5 °C), Min:97.3 °F (36.3 °C), Max:98.1 °F (36.7 °C)    Exam:     Patient without distress. Lungs clear  Abdomen soft,  nontender. Lower extremities are negative for swelling, cords, or tenderness. Lab/Data Review:      WBC   Date/Time Value Ref Range Status   05/15/2018 04:20 AM 4.9 4.6 - 13.2 K/uL Final   2018 12:40 PM 4.8 4.6 - 13.2 K/uL Final   05/10/2018 03:16 PM 3.6 (L) 4.6 - 13.2 K/uL Final     HGB   Date/Time Value Ref Range Status   05/15/2018 04:20 AM 11.5 (L) 12.0 - 16.0 g/dL Final   2018 12:40 PM 13.0 12.0 - 16.0 g/dL Final   05/10/2018 03:16 PM 13.0 12.0 - 16.0 g/dL Final     PLATELET   Date/Time Value Ref Range Status   05/15/2018 04:20  135 - 420 K/uL Final         Patient Instructions:   Current Discharge Medication List      START taking these medications    Details   doxycycline (MONODOX) 100 mg capsule Take 1 Cap by mouth two (2) times a day for 7 days.   Qty: 14 Cap, Refills: 0         CONTINUE these medications which have NOT CHANGED Details   hydroCHLOROthiazide (HYDRODIURIL) 25 mg tablet Take 25 mg by mouth daily. escitalopram oxalate (LEXAPRO) 10 mg tablet Take 10 mg by mouth daily. multivitamin (ONE A DAY) tablet Take 1 Tab by mouth daily. ascorbic acid, vitamin C, (VITAMIN C) 500 mg tablet Take 3,000 mg by mouth daily. glucosamine/chondr knutson A sod (GLUCOSAMINE-CHONDROITIN) 750-600 mg tab Take 1 Tab by mouth daily. homeopathic drugs (ENZYME PO) Take 2 Tabs by mouth daily. cyanocobalamin (VITAMIN B-12) 1,000 mcg sublingual tablet Take 1,000 mcg by mouth daily. Cholecalciferol, Vitamin D3, 3,000 unit tab Take 5,000 Units by mouth daily. ubidecarenone/vitamin E mixed (COQ10  PO) Take 1 Tab by mouth daily. flaxseed 1,000 mg cap Take 2,000 mcg by mouth daily. omega-3 acid ethyl esters (LOVAZA) 1 gram capsule Take 2 g by mouth daily (with breakfast). B.infantis-B.ani-B.long-B.bifi (PROBIOTIC 4X) 10-15 mg TbEC Take 2 Tabs by mouth two (2) times a day. magnesium oxide 500 mg tab Take 500 mg by mouth daily. Activity: No sex, douching, or tampons for 6 weeks or as directed by your physician. No heavy lifting for 6 weeks. No driving while taking pain medication. Diet: Resume pre-hospital diet      Follow-up Appointments   Procedures    FOLLOW UP VISIT Appointment in: Two Weeks     Standing Status:   Standing     Number of Occurrences:   1     Order Specific Question:   Appointment in     Answer:    Two Weeks        Signed By:  Dami Wilson MD     May 15, 2018

## 2018-05-15 NOTE — DISCHARGE INSTRUCTIONS
DISCHARGE SUMMARY from Nurse    PATIENT INSTRUCTIONS:    After general anesthesia or intravenous sedation, for 24 hours or while taking prescription Narcotics:  · Limit your activities  · Do not drive and operate hazardous machinery  · Do not make important personal or business decisions  · Do  not drink alcoholic beverages  · If you have not urinated within 8 hours after discharge, please contact your surgeon on call. Report the following to your surgeon:  · Excessive pain, swelling, redness or odor of or around the surgical area  · Temperature over 100.5  · Nausea and vomiting lasting longer than 4 hours or if unable to take medications  · Any signs of decreased circulation or nerve impairment to extremity: change in color, persistent  numbness, tingling, coldness or increase pain  · Any questions    What to do at Home:  Recommended activity: Activity as tolerated, no heavy lifting, vaginal rest for 6 weeks. If you experience any of the symptoms above, please follow up with Sneha Rush. *  Please give a list of your current medications to your Primary Care Provider. *  Please update this list whenever your medications are discontinued, doses are      changed, or new medications (including over-the-counter products) are added. *  Please carry medication information at all times in case of emergency situations. These are general instructions for a healthy lifestyle:    No smoking/ No tobacco products/ Avoid exposure to second hand smoke  Surgeon General's Warning:  Quitting smoking now greatly reduces serious risk to your health.     Obesity, smoking, and sedentary lifestyle greatly increases your risk for illness    A healthy diet, regular physical exercise & weight monitoring are important for maintaining a healthy lifestyle    You may be retaining fluid if you have a history of heart failure or if you experience any of the following symptoms:  Weight gain of 3 pounds or more overnight or 5 pounds in a week, increased swelling in our hands or feet or shortness of breath while lying flat in bed. Please call your doctor as soon as you notice any of these symptoms; do not wait until your next office visit. Recognize signs and symptoms of STROKE:    F-face looks uneven    A-arms unable to move or move unevenly    S-speech slurred or non-existent    T-time-call 911 as soon as signs and symptoms begin-DO NOT go       Back to bed or wait to see if you get better-TIME IS BRAIN. Warning Signs of HEART ATTACK     Call 911 if you have these symptoms:   Chest discomfort. Most heart attacks involve discomfort in the center of the chest that lasts more than a few minutes, or that goes away and comes back. It can feel like uncomfortable pressure, squeezing, fullness, or pain.  Discomfort in other areas of the upper body. Symptoms can include pain or discomfort in one or both arms, the back, neck, jaw, or stomach.  Shortness of breath with or without chest discomfort.  Other signs may include breaking out in a cold sweat, nausea, or lightheadedness. Don't wait more than five minutes to call 911 - MINUTES MATTER! Fast action can save your life. Calling 911 is almost always the fastest way to get lifesaving treatment. Emergency Medical Services staff can begin treatment when they arrive -- up to an hour sooner than if someone gets to the hospital by car. The discharge information has been reviewed with the patient. The patient verbalized understanding. Discharge medications reviewed with the patient and appropriate educational materials and side effects teaching were provided. Patient armband removed and shredded.   ___________________________________________________________________________________________________________________________________

## 2018-05-15 NOTE — PROGRESS NOTES
Problem: Falls - Risk of  Goal: *Absence of Falls  Document Sherly Fall Risk and appropriate interventions in the flowsheet.    Outcome: Progressing Towards Goal  Fall Risk Interventions:  Mobility Interventions: Communicate number of staff needed for ambulation/transfer, OT consult for ADLs, Patient to call before getting OOB, PT Consult for mobility concerns, Strengthening exercises (ROM-active/passive)         Medication Interventions: Assess postural VS orthostatic hypotension, Evaluate medications/consider consulting pharmacy, Patient to call before getting OOB    Elimination Interventions: Call light in reach, Patient to call for help with toileting needs, Toileting schedule/hourly rounds    History of Falls Interventions: Consult care management for discharge planning, Evaluate medications/consider consulting pharmacy, Room close to nurse's station

## 2018-05-24 NOTE — ADT AUTH CERT NOTES
Coordination of benefits uncertainty. Patient has [employer] as primary, not Medicare, requested authorization      Patient Demographics        Patient Name [address] Sex  Address Phone       Carol Pelaez [phone] Female 1940 [address] [place] [address] [phone] (Home)           CSN:       [id number]           Admit Date: Admit Time Room Bed       May 14, 2018  5:45 AM 2210 [[id number]] 01 [[id number]]           Attending Providers        Provider Pager From To       Kendall Kearns MD  05/14/18 05/15/18           Emergency Contact(s)        Name Relation Home Work Mobile       Fausto Robles [phone]           Utilization Review           LOC:Acute Adult-General Surgical (2018) by Siria Phelps RN        Review Status Review Entered       In Primary 5/15/2018       Details         REVIEW SUMMARY     Patient: Karen Rodriguez  Review Number: [id number]  Review Status: In Primary     Condition Specific: Yes     Condition Level Of Care Code: ACUTE  Condition Level Of Care Description: Acute        OUTCOMES  Outcome Type: Primary           REVIEW DETAILS     Service Date: 2018  Admit Date: 2018  Product: Jm Sep Adult  Subset: General Surgical      (Symptom or finding within 24h)         (Excludes PO medications unless noted)          Select Day, One:              [ ] Operative Day, One:                  [ ] ACUTE, Both:                  ~--Admin, IQ Admin Admin on 05- 09:50 AM--~                  77 yo female direct admit for diagnostic hysteroscopy for post menopausal bleeding.                      VITALS:T- 97.7                  B/P-161/86                  HR-64                  RR-18                  SATS-99  RA                      PRE OP ORDERS:                  ancef 1 g iv                  pepcid 20 mg po                      Patient is admitted as observation post op hysteroscopy as uterus was perforated during surgery with dilation of cervix.  No drainage or bleeding noted from vaginal area. Denies pain currently. Tolerating PO's.                      ORDERS:                  frequent post op vitals                  checks for vaginal bleeding                  IV [email or web address]                  ancef 1 g iv q 8h x 3 doses                  toradol 15 mg iv q 6h prn                  norco 1 tab po q 4h prn                  zofran 4 mg iv q 4h prn                  scds                  IS q 1h                  OOB with assist          Syd Zhang                      [ ] Surgery or invasive procedure, >= One:                      ~--Admin, IQ Admin Admin on 05- 09:45 AM--~                      s/p diagnostic hysteroscopy, uterine perforation of fundus with dilation of cervix                                  [X] Expected post-op course, All:                          [X] DVT prophylaxis or patient ambulatory                          ~--Admin, IQ Admin Admin on 05- 09:46 AM--~                          scds                                      [X] Hydration or nutrition, One:                              [X] Advancing diet as tolerated or nutritional route established                              ~--Admin, IQ Admin Admin on 05- 09:46 AM--~                              LÓPEZ                                      [X] Mobility advancing as tolerated                          ~--Admin, IQ Admin Admin on 05- 09:46 AM--~                          up with assist                                      [X] Pain assessment, One:                              [X] Pain controlled                              ~--Admin, IQ Admin Admin on 05- 09:46 AM--~                              currently controlled                 Version: InterQual® 2017.2  Nga Clinton  © 2017 [employer] and/or one of its subsidiaries. All Rights Reserved. CPT only © 2016 [employer].   All Rights Reserved.

## (undated) DEVICE — HYSTEROSCOPIC PROCEDURE KIT: Brand: QUICKPICK

## (undated) DEVICE — TRAY PREP DRY W/ PREM GLV 2 APPL 6 SPNG 2 UNDPD 1 OVERWRAP

## (undated) DEVICE — KENDALL SCD EXPRESS SLEEVES, KNEE LENGTH, MEDIUM: Brand: KENDALL SCD

## (undated) DEVICE — STERILE POLYISOPRENE POWDER-FREE SURGICAL GLOVES WITH EMOLLIENT COATING: Brand: PROTEXIS

## (undated) DEVICE — Device

## (undated) DEVICE — TYPE II HYGROSCOPIC CONDENSER HUMIDIFIER (HCH): Brand: AIRLIFE™

## (undated) DEVICE — STERILE POLYISOPRENE POWDER-FREE SURGICAL GLOVES: Brand: PROTEXIS

## (undated) DEVICE — GOWN,SIRUS,NONRNF,SETINSLV,2XL,18/CS: Brand: MEDLINE

## (undated) DEVICE — MAYO STAND COVER: Brand: CONVERTORS

## (undated) DEVICE — HYSTEROSCOPIC INFLOW TUBE SET

## (undated) DEVICE — REM POLYHESIVE ADULT PATIENT RETURN ELECTRODE: Brand: VALLEYLAB

## (undated) DEVICE — SOLUTION IRRIG 3000ML 0.9% SOD CHL FLX CONT 0797208] ICU MEDICAL INC]

## (undated) DEVICE — CATH URETH IVNYL 16FRX16IN

## (undated) DEVICE — 3L THIN WALL CAN: Brand: CRD

## (undated) DEVICE — DEPAUL HYSTEROSCOPY PACK: Brand: MEDLINE INDUSTRIES, INC.

## (undated) DEVICE — HYSTEROSCOPIC SET OUTFLO TUBE FOR FLUID MGMT SYS TRUCLEAR